# Patient Record
Sex: MALE | Race: BLACK OR AFRICAN AMERICAN | NOT HISPANIC OR LATINO | URBAN - METROPOLITAN AREA
[De-identification: names, ages, dates, MRNs, and addresses within clinical notes are randomized per-mention and may not be internally consistent; named-entity substitution may affect disease eponyms.]

---

## 2018-08-09 ENCOUNTER — OUTPATIENT (OUTPATIENT)
Dept: OUTPATIENT SERVICES | Facility: HOSPITAL | Age: 23
LOS: 1 days | Discharge: HOME | End: 2018-08-09

## 2018-08-09 DIAGNOSIS — Z00.00 ENCOUNTER FOR GENERAL ADULT MEDICAL EXAMINATION WITHOUT ABNORMAL FINDINGS: ICD-10-CM

## 2018-08-09 DIAGNOSIS — E66.9 OBESITY, UNSPECIFIED: ICD-10-CM

## 2019-03-07 ENCOUNTER — EMERGENCY (EMERGENCY)
Facility: HOSPITAL | Age: 24
LOS: 0 days | Discharge: HOME | End: 2019-03-07
Admitting: PHYSICIAN ASSISTANT

## 2019-03-07 VITALS — RESPIRATION RATE: 19 BRPM | HEART RATE: 74 BPM

## 2019-03-07 VITALS
SYSTOLIC BLOOD PRESSURE: 194 MMHG | HEART RATE: 115 BPM | DIASTOLIC BLOOD PRESSURE: 111 MMHG | OXYGEN SATURATION: 99 % | TEMPERATURE: 98 F | RESPIRATION RATE: 18 BRPM

## 2019-03-07 DIAGNOSIS — Z77.011 CONTACT WITH AND (SUSPECTED) EXPOSURE TO LEAD: ICD-10-CM

## 2019-03-07 DIAGNOSIS — R51 HEADACHE: ICD-10-CM
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
## 2019-03-07 NOTE — ED ADULT NURSE NOTE - OBJECTIVE STATEMENT
Patient c/o of headache after being in a room today at work with lead containers no oral or skin contact with lead.  No dizziness or SOB

## 2019-03-07 NOTE — ED ADULT NURSE NOTE - PAIN: PRECIPITATING FACTORS
state he went in a room with lead containers in there a supervisor told him not to go in that room and he states he felt weird and got a headache

## 2019-03-07 NOTE — ED ADULT NURSE NOTE - ILLNESS RECENT EXPOSURE
None known he states he was exposed to lead in containers he claims he did not touch container states he was only in the room

## 2019-03-07 NOTE — ED PROVIDER NOTE - OBJECTIVE STATEMENT
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

## 2019-03-07 NOTE — ED PROVIDER NOTE - NSFOLLOWUPCLINICS_GEN_ALL_ED_FT
[facility]  Medicine  [address], [place]   Phone: [phone]  Fax:   Follow Up Time:

## 2019-03-07 NOTE — ED ADULT NURSE NOTE - CHPI ED NUR SYMPTOMS NEG
no rash/no shortness of breath/no itching/no loss of consciousness/no nausea/no pulling at ears/not acting differently/no edema/no cough/no dark urine/no tingling/no difficulty breathing/no discoloration/no facial pain/pressure/no fatigue/no thirst/no vomiting/no change in level of consciousness/no irritability/no sleeping issues/no diarrhea/no disorientation/no dizziness/no abdominal distension/no seizure/no dehydration/no fever/no crying/no decreased eating/drinking/no ataxia/no blurred vision/no lethargy/no weakness/no numbness/no redness

## 2019-03-07 NOTE — ED PROVIDER NOTE - PHYSICAL EXAMINATION
CONSTITUTIONAL: Well-appearing; well-nourished; in no apparent distress.   NECK: Supple; non-tender; no cervical lymphadenopathy. No JVD.   CARDIOVASCULAR: Normal S1, S2; no murmurs, rubs, or gallops.   RESPIRATORY: Normal chest excursion with respiration; breath sounds clear and equal bilaterally; no wheezes, rhonchi, or rales.  SKIN: Normal for age and race; warm; dry; good turgor; no apparent lesions or exudate.   NEURO/PSYCH: A & O x 4; grossly unremarkable. mood and manner are appropriate. Grooming and personal hygiene are appropriate. No apparent thoughts of harm to self or others.

## 2022-09-08 ENCOUNTER — EMERGENCY (EMERGENCY)
Facility: HOSPITAL | Age: 27
LOS: 0 days | Discharge: HOME | End: 2022-09-08
Attending: EMERGENCY MEDICINE

## 2022-09-08 VITALS — SYSTOLIC BLOOD PRESSURE: 145 MMHG | HEART RATE: 109 BPM | DIASTOLIC BLOOD PRESSURE: 94 MMHG

## 2022-09-08 VITALS
TEMPERATURE: 98 F | HEART RATE: 121 BPM | OXYGEN SATURATION: 99 % | WEIGHT: 315 LBS | SYSTOLIC BLOOD PRESSURE: 162 MMHG | DIASTOLIC BLOOD PRESSURE: 119 MMHG | RESPIRATION RATE: 20 BRPM

## 2022-09-08 PROCEDURE — 99283 EMERGENCY DEPT VISIT LOW MDM: CPT

## 2022-09-08 RX ORDER — AMOXICILLIN 250 MG/5ML
500 SUSPENSION, RECONSTITUTED, ORAL (ML) ORAL ONCE
Refills: 0 | Status: COMPLETED | OUTPATIENT
Start: 2022-09-08 | End: 2022-09-08

## 2022-09-08 RX ORDER — AMOXICILLIN 250 MG/5ML
1 SUSPENSION, RECONSTITUTED, ORAL (ML) ORAL
Qty: 14 | Refills: 0
Start: 2022-09-08 | End: 2022-09-14

## 2022-09-08 RX ORDER — OFLOXACIN 0.3 %
1 DROPS OPHTHALMIC (EYE) ONCE
Refills: 0 | Status: DISCONTINUED | OUTPATIENT
Start: 2022-09-08 | End: 2022-09-08

## 2022-09-08 RX ADMIN — Medication 500 MILLIGRAM(S): at 21:31

## 2022-09-08 NOTE — ED PROVIDER NOTE - PATIENT PORTAL LINK FT
You can access the FollowMyHealth Patient Portal offered by [facility] by registering at the following website: [email or web address]. By joining [employer]’s FollowMyHealth portal, you will also be able to view your health information using other applications (apps) compatible with our system.

## 2022-09-08 NOTE — ED PROVIDER NOTE - OBJECTIVE STATEMENT
27-year-old male past medical history of hypertension noncompliant with medication presents to ED for left ear fullness.  Patient states he uses hydrogen peroxide to clean his ears because like after he uses it he gets this fullness.  No fever no pain no discharge.

## 2022-09-08 NOTE — ED ADULT NURSE NOTE - CAS ELECT INFOMATION PROVIDED
pt instructed to follow up with pmd in 1-3 days or return to ed for new or worsening symptoms, take medications as discussed/DC instructions

## 2022-09-08 NOTE — ED ADULT NURSE NOTE - CAS TRG GENERAL AIRWAY, MLM
[None] : had no significant interval events [Vomiting] : denies vomiting [Yellow Skin Or Eyes (Jaundice)] : denies jaundice [Diarrhea] : denies diarrhea [Nausea] : denies nausea [Heartburn] : heartburn [Abdominal Swelling] : abdominal swelling [Abdominal Pain] : abdominal pain [Constipation] : constipation [Rectal Pain] : rectal pain [Hiatus Hernia] : hiatus hernia [GERD] : gastroesophageal reflux disease [Wt Gain ___ Lbs] : no recent weight gain [Peptic Ulcer Disease] : no peptic ulcer disease [Wt Loss ___ Lbs] : no recent weight loss [Cholelithiasis] : no cholelithiasis [Pancreatitis] : no pancreatitis [Kidney Stone] : no kidney stone [Irritable Bowel Syndrome] : no irritable bowel syndrome [Inflammatory Bowel Disease] : no inflammatory bowel disease [Diverticulitis] : no diverticulitis [Alcohol Abuse] : no alcohol abuse [Malignancy] : no malignancy [Abdominal Surgery] : no abdominal surgery [Appendectomy] : no appendectomy [Cholecystectomy] : no cholecystectomy [de-identified] : The patient states that he is feeling fine. The patient denies any jaundice or pruritus.  The patient denies any chronic lower back pain. The patient complains of abdominal pain.  The patient describes the abdominal pain as a crampy, intermittent midepigastric and left upper quadrant discomfort that is nonradiating in nature.  The abdominal pain is occasionally worse with stress.  The abdominal pain is worse with meals and improves with passing gas or having a bowel movement.  The abdominal pain is described as being mild in nature.  The abdominal pain occurs during the day.  The abdominal pain can occur at any time.   The abdominal pain has never awakened the patient from sleep.  The abdominal pain is not relieved with medication.  The abdominal pain is associated with abdominal gas and bloating.  The patient denies any nausea or vomiting.  The patient complains of occasional gastroesophageal reflux disease but denies any dysphagia.  The patient denies taking medications for the gastroesophageal reflux disease.  The gastroesophageal reflux disease is worse after meals and late at night and in the early morning. The patient denies any atypical chest pain, shortness of breath or palpitations.  The patient denies any diaphoresis. The patient denies any constipation or diarrhea.  The patient has 1 bowel movement a day.  The patient denies a change in bowel habits.  The patient denies a change in caliber of stool.  The patient denies having mucus discharge with the bowel movements.  The patient denies any bright red blood per rectum, melena or hematemesis.  The patient complains of rectal pain and rectal pruritus.  The patient denies any weight loss or anorexia.  The patient admits to losing 5 pounds over the past 4 weeks/months.  He denies any fevers or chills.  The patient had an upper endoscopy and colonoscopy to the cecum and terminal ileum performed at [place] [facility] on September 1, 2020. The upper endoscopy was performed up to the level of the second portion of the duodenum. The upper endoscopy revealed mild mid to distal esophagitis with linear erosions, a small hiatal hernia and mild diffuse gastritis. Biopsies were taken of the distal esophagus, antrum, body of stomach and duodenum to assess for esophagitis, gastritis and duodenitis. The pathology revealed distal esophagus with fragments of squamous esophageal epithelium carpeted with eosinophils too numerous to count suggestive of eosinophilic esophagitis with no evidence of fungal microorganisms,  gastric antral mucosa with chronic moderate active gastritis with focal incomplete intestinal metaplasia that was positive for Helicobacter pylori, gastric antral and body mucosa with chronic moderate active gastritis that was positive for Helicobacter pylori and unremarkable duodenal mucosa with preserved villous architecture with no evidence of parasites or intraepithelial lymphocytes.  The colonoscopy to the terminal ileum revealed a normal but long and tortuous colon and small internal hemorrhoids.  Random biopsies were taken of the terminal ileum and cecum to assess for ileitis and microscopic colitis. There were no other polyps, masses, diverticulosis, AVMs or colitis noted.  The pathology revealed unremarkable ileal mucosa, and unremarkable colonic mucosa with preserved crypt architecture and marked acute and chronic inflammation with cryptitis. The patient tolerated the procedures well.  The patient admits to a family history of GI problems. The patient’s maternal aunt had ?liver vs. gallbladder issue that required surgery. The patient’s sister had a history of colonic polyps age 45.  [de-identified] : (-) smoking, (-) ETOH, (-) IVDA\par  Patent

## 2022-09-08 NOTE — ED PROVIDER NOTE - NS ED ROS FT
Review of Systems   Constitutional:  No Weight Change, No Fever, No Chills, No weakness    ENT/Mouth:  No Nasal Congestion, No Hoarseness, No sore throat, No Rhinorrhea, No Swallowing Difficulty, L ear fullness   Eyes:  No Eye Pain, No Swelling, No Redness, No Discharge, No Vision Changes  Cardiovascular:  No Chest Pain,   Respiratory:  No SOB, No Cough, No Wheezing  Gastrointestinal:  No Nausea, No Vomiting, No abdominal pain,   Musculoskeletal:  No Arthralgias, No Myalgias, No Joint Swelling, No Joint Stiffness,  Skin:  No rashes

## 2022-09-08 NOTE — ED PROVIDER NOTE - PHYSICAL EXAMINATION
Const: NAD  Eyes: PERRL, no conjunctival injection  HENT:  Neck supple without meningismus, Right TM normal right ear canal normal no pain with movement of pinna no mastoid tenderness.  Left ear canal erythematous with swelling to ear canal.  No mastoid tenderness.   CV: RRR, Warm, well-perfused extremities  RESP: CTA B/L, no tachypnea   MSK: No gross deformities appreciated  Skin: Warm, dry. No rashes  Neuro: Alert, CNs II-XII grossly intact. Sensation and motor function of extremities grossly intact.  Psych: Appropriate mood and affect.

## 2022-09-08 NOTE — ED PROVIDER NOTE - CLINICAL SUMMARY MEDICAL DECISION MAKING FREE TEXT BOX
27-year-old male presented ED for ear fullness due to otitis externa and media.  Patient got the ofloxacin drops in the emergency department and first dose of amoxicillin.  Patient discharged home with amoxicillin.  DC home with strict return precautions.  Patient is hypertensive in the emergency room and due to not taking his blood pressure medication counseled patient on risks of high blood pressure.  Patient understands

## 2022-09-10 DIAGNOSIS — H92.02 OTALGIA, LEFT EAR: ICD-10-CM

## 2022-09-10 DIAGNOSIS — I10 ESSENTIAL (PRIMARY) HYPERTENSION: ICD-10-CM

## 2022-09-10 DIAGNOSIS — H60.92 UNSPECIFIED OTITIS EXTERNA, LEFT EAR: ICD-10-CM

## 2022-09-10 DIAGNOSIS — Z91.14 PATIENT'S OTHER NONCOMPLIANCE WITH MEDICATION REGIMEN: ICD-10-CM

## 2022-09-10 DIAGNOSIS — H66.92 OTITIS MEDIA, UNSPECIFIED, LEFT EAR: ICD-10-CM

## 2024-06-17 ENCOUNTER — EMERGENCY (EMERGENCY)
Facility: HOSPITAL | Age: 29
LOS: 0 days | Discharge: HOME | End: 2024-06-17
Attending: EMERGENCY MEDICINE
Payer: COMMERCIAL

## 2024-06-17 VITALS
HEART RATE: 99 BPM | SYSTOLIC BLOOD PRESSURE: 166 MMHG | WEIGHT: 315 LBS | OXYGEN SATURATION: 99 % | TEMPERATURE: 99 F | RESPIRATION RATE: 18 BRPM | HEIGHT: 74 IN | DIASTOLIC BLOOD PRESSURE: 106 MMHG

## 2024-06-17 DIAGNOSIS — H60.92 UNSPECIFIED OTITIS EXTERNA, LEFT EAR: ICD-10-CM

## 2024-06-17 DIAGNOSIS — H92.02 OTALGIA, LEFT EAR: ICD-10-CM

## 2024-06-17 PROCEDURE — 99283 EMERGENCY DEPT VISIT LOW MDM: CPT

## 2024-06-17 RX ORDER — CIPROFLOXACIN AND DEXAMETHASONE 3; 1 MG/ML; MG/ML
4 SUSPENSION/ DROPS AURICULAR (OTIC)
Qty: 1 | Refills: 0
Start: 2024-06-17 | End: 2024-06-23

## 2024-06-17 NOTE — ED PROVIDER NOTE - CLINICAL SUMMARY MEDICAL DECISION MAKING FREE TEXT BOX
29-year-old male with no significant past medical history, no diabetes presents with left ear discomfort.  No fever.  Had put hydrogen peroxide in the ear.  On exam nontoxic, vital signs noted, left external auditory canal with some mild swelling and discharge/exudates but able to visualize tympanic membrane and no effusion or bulging.  No external erythema, swelling or tenderness.  No mastoid tenderness or swelling.  No meningismus.  Clinically has otitis externa.  Will treat with drops.  In my opinion, based on current evaluation and results, an acute medical or surgical emergency does not appear to be occurring at this time and I feel that the pt is stable for further outpatient work up and/or treatment.  Return precautions discussed.

## 2024-06-17 NOTE — ED PROVIDER NOTE - OBJECTIVE STATEMENT
29-year-old male no notable past medical history coming with complaints of ear discomfort.  Patient reports that his ear was feeling a little weird when he reports some hydrogen peroxide and earlier in the day, still feeling some discomfort.  Denies any Q-tip usage.  Just feeling off.

## 2024-06-17 NOTE — ED PROVIDER NOTE - PATIENT PORTAL LINK FT
You can access the FollowMyHealth Patient Portal offered by [facility] by registering at the following website: [email or web address]. By joining [employer]’s FollowMyHealth portal, you will also be able to view your health information using other applications (apps) compatible with our system.

## 2024-06-17 NOTE — ED PROVIDER NOTE - NSFOLLOWUPINSTRUCTIONS_ED_ALL_ED_FT
Follow up with your primary care doctor within 1 week. Please return to the emergency department if you have ear pain, more changes in your hearing, discharge or drainage from the ear, bleeding, ringing in your ears, redness/swelling to the outer ear, headache, nausea, vomiting, neck pain, fever, or any other concerning signs or symptoms.     Otitis Externa  Ear anatomy showing the outer ear canal and the eardrum. The outer ear canal is red due to swimmer's ear.  Otitis externa is an infection of the outer ear canal. The outer ear canal is the area between the outside of the ear and the eardrum. Otitis externa is sometimes called swimmer's ear.    What are the causes?  Common causes of this condition include:  Swimming in dirty water.  Moisture in the ear.  An injury to the inside of the ear.  An object stuck in the ear.  A cut or scrape on the outside of the ear or in the ear canal.  What increases the risk?  You are more likely to get this condition if you go swimming often.    What are the signs or symptoms?  Itching in the ear. This is often the first symptom.  Swelling of the ear.  Redness in the ear.  Ear pain. The pain may get worse when you pull on your ear.  Pus coming from the ear.  How is this treated?  This condition may be treated with:  Antibiotic ear drops. These are often given for 10–14 days.  Medicines to reduce itching and swelling.  Follow these instructions at home:  If you were prescribed antibiotic ear drops, use them as told by your doctor. Do not stop using them even if you start to feel better.  Take over-the-counter and prescription medicines only as told by your doctor.  Avoid getting water in your ears as told by your doctor. You may be told to avoid swimming or water sports for a few days.  Keep all follow-up visits.  How is this prevented?  Keep your ears dry. Use the corner of a towel to dry your ears after you swim or bathe.  Try not to scratch or put things in your ear. Doing these things makes it easier for germs to grow in your ear.  Avoid swimming in lakes, dirty water, or swimming pools that may not have the right amount of a chemical called chlorine.  Contact a doctor if:  You have a fever.  Your ear is still red, swollen, or painful after 3 days.  You still have pus coming from your ear after 3 days.  Your redness, swelling, or pain gets worse.  You have a very bad headache.  Get help right away if:  You have redness, swelling, and pain or tenderness behind your ear.  Summary  Otitis externa is an infection of the outer ear canal.  Symptoms include pain, redness, and swelling of the ear.  If you were prescribed antibiotic ear drops, use them as told by your doctor. Do not stop using them even if you start to feel better.  Try not to scratch or put things in your ear.  This information is not intended to replace advice given to you by your health care provider. Make sure you discuss any questions you have with your health care provider.

## 2024-06-17 NOTE — ED PROVIDER NOTE - PHYSICAL EXAMINATION
CONSTITUTIONAL: NAD  SKIN: Warm dry  HEAD: NCAT  EYES: NL inspection  ENT: MMM, Left EAC with some debris noted.  TM appreciated normal.  No pain with manipulation of tragus or external ear.  NEURO: Grossly unremarkable  PSYCH: Cooperative, appropriate.

## 2024-06-17 NOTE — ED ADULT TRIAGE NOTE - CHIEF COMPLAINT QUOTE
Pt c/o L ear pain and "feels fluid in the ear". Pt reports he put hydrogen peroxide yesterday in affected ear.